# Patient Record
Sex: MALE | Employment: STUDENT | URBAN - NONMETROPOLITAN AREA
[De-identification: names, ages, dates, MRNs, and addresses within clinical notes are randomized per-mention and may not be internally consistent; named-entity substitution may affect disease eponyms.]

---

## 2024-04-03 ENCOUNTER — OFFICE VISIT (OUTPATIENT)
Dept: FAMILY MEDICINE CLINIC | Age: 19
End: 2024-04-03
Payer: COMMERCIAL

## 2024-04-03 VITALS
RESPIRATION RATE: 14 BRPM | HEIGHT: 71 IN | BODY MASS INDEX: 28.61 KG/M2 | SYSTOLIC BLOOD PRESSURE: 120 MMHG | OXYGEN SATURATION: 98 % | WEIGHT: 204.4 LBS | HEART RATE: 97 BPM | TEMPERATURE: 98.2 F | DIASTOLIC BLOOD PRESSURE: 78 MMHG

## 2024-04-03 DIAGNOSIS — R06.02 EXERCISE-INDUCED SHORTNESS OF BREATH: Primary | ICD-10-CM

## 2024-04-03 PROCEDURE — 99204 OFFICE O/P NEW MOD 45 MIN: CPT | Performed by: NURSE PRACTITIONER

## 2024-04-03 RX ORDER — ALBUTEROL SULFATE 90 UG/1
2 AEROSOL, METERED RESPIRATORY (INHALATION) EVERY 4 HOURS PRN
Qty: 18 G | Refills: 2 | Status: SHIPPED | OUTPATIENT
Start: 2024-04-03

## 2024-04-03 RX ORDER — ARIPIPRAZOLE 400 MG
KIT INTRAMUSCULAR
COMMUNITY
Start: 2024-03-19

## 2024-04-03 RX ORDER — FAMOTIDINE 20 MG/1
20 TABLET, FILM COATED ORAL 2 TIMES DAILY PRN
Qty: 180 TABLET | Refills: 1 | Status: SHIPPED | OUTPATIENT
Start: 2024-04-03

## 2024-04-03 SDOH — ECONOMIC STABILITY: FOOD INSECURITY: WITHIN THE PAST 12 MONTHS, YOU WORRIED THAT YOUR FOOD WOULD RUN OUT BEFORE YOU GOT MONEY TO BUY MORE.: NEVER TRUE

## 2024-04-03 SDOH — ECONOMIC STABILITY: INCOME INSECURITY: HOW HARD IS IT FOR YOU TO PAY FOR THE VERY BASICS LIKE FOOD, HOUSING, MEDICAL CARE, AND HEATING?: NOT HARD AT ALL

## 2024-04-03 SDOH — ECONOMIC STABILITY: HOUSING INSECURITY
IN THE LAST 12 MONTHS, WAS THERE A TIME WHEN YOU DID NOT HAVE A STEADY PLACE TO SLEEP OR SLEPT IN A SHELTER (INCLUDING NOW)?: NO

## 2024-04-03 SDOH — ECONOMIC STABILITY: FOOD INSECURITY: WITHIN THE PAST 12 MONTHS, THE FOOD YOU BOUGHT JUST DIDN'T LAST AND YOU DIDN'T HAVE MONEY TO GET MORE.: NEVER TRUE

## 2024-04-03 ASSESSMENT — PATIENT HEALTH QUESTIONNAIRE - PHQ9
1. LITTLE INTEREST OR PLEASURE IN DOING THINGS: NOT AT ALL
SUM OF ALL RESPONSES TO PHQ QUESTIONS 1-9: 0
2. FEELING DOWN, DEPRESSED OR HOPELESS: NOT AT ALL
SUM OF ALL RESPONSES TO PHQ9 QUESTIONS 1 & 2: 0

## 2024-04-03 NOTE — PROGRESS NOTES
Juan Luis Paul is a 18 y.o. male thatpresents for New Patient      History obtained today from Patient.    Diet: appetite is okay, drinks a lot of water   Exercise: regularly   Sleep: no issues    Concerns today: needs abilify, sob with exercise    Used room mate inhaler and helped with exercise induced sob  No wheezing  Doesn't have issues at rest  Would like inhaler  Denies hx of asthma      GERD issues  Taking a lot of tums, prilosec prn  Not helping much   Knows his diet is triggering it    Right flank pain  Only happens with stretching  No fever, chills, chest pain, sob, urinary symptoms, constipation, diarrhea, bloody stools or urine  No injury but has been working out    I have reviewed the patient's past medical history, past surgical history, allergies,medications, social and family history and I have made updates where appropriate.    Past Medical History:   Diagnosis Date    ADHD (attention deficit hyperactivity disorder)        Social History     Tobacco Use    Smoking status: Never     Passive exposure: Never    Smokeless tobacco: Never   Vaping Use    Vaping Use: Former    Substances: Nicotine    Devices: Disposable    Passive vaping exposure: Yes   Substance Use Topics    Alcohol use: Never       History reviewed. No pertinent family history.      Review of Systems        PHYSICAL EXAM:  /78   Pulse 97   Temp 98.2 °F (36.8 °C) (Oral)   Resp 14   Ht 1.803 m (5' 11\")   Wt 92.7 kg (204 lb 6.4 oz)   SpO2 98%   BMI 28.51 kg/m²     Physical Exam  Constitutional:       Appearance: Normal appearance.   HENT:      Head: Normocephalic and atraumatic.      Right Ear: External ear normal.      Left Ear: External ear normal.      Nose: Nose normal.      Mouth/Throat:      Mouth: Mucous membranes are moist.   Eyes:      Conjunctiva/sclera: Conjunctivae normal.   Cardiovascular:      Rate and Rhythm: Normal rate and regular rhythm.      Pulses: Normal pulses.      Heart sounds: Normal heart sounds.

## 2024-04-05 ENCOUNTER — TELEPHONE (OUTPATIENT)
Dept: FAMILY MEDICINE CLINIC | Age: 19
End: 2024-04-05

## 2024-04-05 NOTE — TELEPHONE ENCOUNTER
Please call mom and get info for previuos pcp and psych so we can get records from them, thank you

## 2024-04-05 NOTE — TELEPHONE ENCOUNTER
Faxed a medical records release form over to his last Psych doctor on 4/3/14, mom states he didn't have a previous PCP, just Psych.

## 2024-04-19 ENCOUNTER — HOSPITAL ENCOUNTER (OUTPATIENT)
Dept: GENERAL RADIOLOGY | Age: 19
Discharge: HOME | End: 2024-04-19
Payer: COMMERCIAL

## 2024-04-19 ENCOUNTER — HOSPITAL ENCOUNTER (OUTPATIENT)
Age: 19
Discharge: HOME | End: 2024-04-19
Payer: COMMERCIAL

## 2024-04-19 ENCOUNTER — OFFICE VISIT (OUTPATIENT)
Dept: FAMILY MEDICINE CLINIC | Age: 19
End: 2024-04-19
Payer: COMMERCIAL

## 2024-04-19 VITALS
HEART RATE: 88 BPM | WEIGHT: 212.2 LBS | BODY MASS INDEX: 29.71 KG/M2 | SYSTOLIC BLOOD PRESSURE: 120 MMHG | HEIGHT: 71 IN | DIASTOLIC BLOOD PRESSURE: 82 MMHG | OXYGEN SATURATION: 97 % | TEMPERATURE: 98.5 F | RESPIRATION RATE: 14 BRPM

## 2024-04-19 DIAGNOSIS — R07.81 RIB PAIN ON RIGHT SIDE: ICD-10-CM

## 2024-04-19 DIAGNOSIS — Z72.0 NICOTINE ABUSE: ICD-10-CM

## 2024-04-19 DIAGNOSIS — R07.81 RIB PAIN ON RIGHT SIDE: Primary | ICD-10-CM

## 2024-04-19 PROCEDURE — 99214 OFFICE O/P EST MOD 30 MIN: CPT | Performed by: NURSE PRACTITIONER

## 2024-04-19 PROCEDURE — 71101 X-RAY EXAM UNILAT RIBS/CHEST: CPT

## 2024-04-19 NOTE — PROGRESS NOTES
Juan Luis Paul is a 18 y.o. male thatpresents for Rib pain (Heartburn as well and cannot get his Abilify shot )      History obtained today from Patient.    Heartburn is still uncontrolled   Using Famotidine BID PRN with minimal relief   Then says he isn't taking it  No bloody stools or emesis  Not altering his diet for reflux    Still waiting for his Abilify shot to be filled    I have reviewed the patient's past medical history, past surgical history, allergies,medications, social and family history and I have made updates where appropriate.    Past Medical History:   Diagnosis Date    ADHD (attention deficit hyperactivity disorder)        Social History     Tobacco Use    Smoking status: Never     Passive exposure: Never    Smokeless tobacco: Never   Vaping Use    Vaping Use: Former    Substances: Nicotine    Devices: Disposable    Passive vaping exposure: Yes   Substance Use Topics    Alcohol use: Never       History reviewed. No pertinent family history.      Review of Systems        PHYSICAL EXAM:  /82   Pulse 88   Temp 98.5 °F (36.9 °C) (Oral)   Resp 14   Ht 1.803 m (5' 11\")   Wt 96.3 kg (212 lb 3.2 oz)   SpO2 97%   BMI 29.60 kg/m²     Physical Exam  Constitutional:       Appearance: Normal appearance.   HENT:      Head: Normocephalic and atraumatic.      Right Ear: External ear normal.      Left Ear: External ear normal.      Nose: Nose normal.      Mouth/Throat:      Mouth: Mucous membranes are moist.   Eyes:      Conjunctiva/sclera: Conjunctivae normal.   Cardiovascular:      Rate and Rhythm: Normal rate and regular rhythm.      Pulses: Normal pulses.      Heart sounds: Normal heart sounds.   Pulmonary:      Effort: Pulmonary effort is normal.      Breath sounds: Normal breath sounds.   Abdominal:      General: Bowel sounds are normal.      Palpations: Abdomen is soft.   Musculoskeletal:         General: Normal range of motion.      Cervical back: Normal range of motion.   Skin:     General:

## 2024-04-22 ENCOUNTER — TELEPHONE (OUTPATIENT)
Dept: FAMILY MEDICINE CLINIC | Age: 19
End: 2024-04-22

## 2024-04-22 NOTE — TELEPHONE ENCOUNTER
Pt is questioning since his x-ray is negative and he is still having mild pain with movement, what is the next step.    Please advise
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
## 2024-04-24 ENCOUNTER — HOSPITAL ENCOUNTER (EMERGENCY)
Age: 19
Discharge: HOME | End: 2024-04-24
Attending: EMERGENCY MEDICINE
Payer: COMMERCIAL

## 2024-04-24 ENCOUNTER — APPOINTMENT (OUTPATIENT)
Dept: GENERAL RADIOLOGY | Age: 19
End: 2024-04-24
Payer: COMMERCIAL

## 2024-04-24 ENCOUNTER — NURSE ONLY (OUTPATIENT)
Dept: FAMILY MEDICINE CLINIC | Age: 19
End: 2024-04-24

## 2024-04-24 VITALS
DIASTOLIC BLOOD PRESSURE: 67 MMHG | HEART RATE: 73 BPM | SYSTOLIC BLOOD PRESSURE: 101 MMHG | OXYGEN SATURATION: 98 % | TEMPERATURE: 98.7 F | WEIGHT: 205 LBS | BODY MASS INDEX: 28.7 KG/M2 | HEIGHT: 71 IN | RESPIRATION RATE: 16 BRPM

## 2024-04-24 DIAGNOSIS — K29.01 ACUTE GASTRITIS WITH HEMORRHAGE, UNSPECIFIED GASTRITIS TYPE: Primary | ICD-10-CM

## 2024-04-24 DIAGNOSIS — F41.1 GENERALIZED ANXIETY DISORDER: Primary | ICD-10-CM

## 2024-04-24 LAB
ALBUMIN SERPL BCG-MCNC: 4.2 G/DL (ref 3.5–5.1)
ALP SERPL-CCNC: 71 U/L (ref 38–126)
ALT SERPL W/O P-5'-P-CCNC: 25 U/L (ref 11–66)
ANION GAP SERPL CALC-SCNC: 10 MEQ/L (ref 8–16)
AST SERPL-CCNC: 26 U/L (ref 5–40)
BASOPHILS ABSOLUTE: 0.1 THOU/MM3 (ref 0–0.1)
BASOPHILS NFR BLD AUTO: 1 %
BILIRUB CONJ SERPL-MCNC: < 0.2 MG/DL (ref 0–0.3)
BILIRUB SERPL-MCNC: 0.3 MG/DL (ref 0.3–1.2)
BUN SERPL-MCNC: 15 MG/DL (ref 7–22)
CALCIUM SERPL-MCNC: 9.4 MG/DL (ref 8.5–10.5)
CHLORIDE SERPL-SCNC: 102 MEQ/L (ref 98–111)
CO2 SERPL-SCNC: 27 MEQ/L (ref 23–33)
CREAT SERPL-MCNC: 0.9 MG/DL (ref 0.4–1.2)
DEPRECATED RDW RBC AUTO: 40.7 FL (ref 35–45)
EOSINOPHIL NFR BLD AUTO: 6.1 %
EOSINOPHILS ABSOLUTE: 0.5 THOU/MM3 (ref 0–0.4)
ERYTHROCYTE [DISTWIDTH] IN BLOOD BY AUTOMATED COUNT: 13.2 % (ref 11.5–14.5)
GFR SERPL CREATININE-BSD FRML MDRD: > 90 ML/MIN/1.73M2
GLUCOSE SERPL-MCNC: 90 MG/DL (ref 70–108)
HCT VFR BLD AUTO: 41.9 % (ref 42–52)
HGB BLD-MCNC: 13.7 GM/DL (ref 14–18)
IMM GRANULOCYTES # BLD AUTO: 0.01 THOU/MM3 (ref 0–0.07)
IMM GRANULOCYTES NFR BLD AUTO: 0.1 %
LIPASE SERPL-CCNC: 21.6 U/L (ref 5.6–51.3)
LYMPHOCYTES ABSOLUTE: 3.1 THOU/MM3 (ref 1–4.8)
LYMPHOCYTES NFR BLD AUTO: 38.7 %
MCH RBC QN AUTO: 27.6 PG (ref 26–33)
MCHC RBC AUTO-ENTMCNC: 32.7 GM/DL (ref 32.2–35.5)
MCV RBC AUTO: 84.3 FL (ref 80–94)
MONOCYTES ABSOLUTE: 0.6 THOU/MM3 (ref 0.4–1.3)
MONOCYTES NFR BLD AUTO: 7.6 %
NEUTROPHILS NFR BLD AUTO: 46.5 %
NRBC BLD AUTO-RTO: 0 /100 WBC
OSMOLALITY SERPL CALC.SUM OF ELEC: 277.9 MOSMOL/KG (ref 275–300)
PLATELET # BLD AUTO: 297 THOU/MM3 (ref 130–400)
PMV BLD AUTO: 9.6 FL (ref 9.4–12.4)
POTASSIUM SERPL-SCNC: 4.3 MEQ/L (ref 3.5–5.2)
PROT SERPL-MCNC: 6.8 G/DL (ref 6.1–8)
RBC # BLD AUTO: 4.97 MILL/MM3 (ref 4.7–6.1)
SEGMENTED NEUTROPHILS ABSOLUTE COUNT: 3.7 THOU/MM3 (ref 1.8–7.7)
SODIUM SERPL-SCNC: 139 MEQ/L (ref 135–145)
WBC # BLD AUTO: 8 THOU/MM3 (ref 4.8–10.8)

## 2024-04-24 PROCEDURE — 36415 COLL VENOUS BLD VENIPUNCTURE: CPT

## 2024-04-24 PROCEDURE — 99284 EMERGENCY DEPT VISIT MOD MDM: CPT

## 2024-04-24 PROCEDURE — 85025 COMPLETE CBC W/AUTO DIFF WBC: CPT

## 2024-04-24 PROCEDURE — 96374 THER/PROPH/DIAG INJ IV PUSH: CPT

## 2024-04-24 PROCEDURE — 71045 X-RAY EXAM CHEST 1 VIEW: CPT

## 2024-04-24 PROCEDURE — 80053 COMPREHEN METABOLIC PANEL: CPT

## 2024-04-24 PROCEDURE — A4216 STERILE WATER/SALINE, 10 ML: HCPCS | Performed by: EMERGENCY MEDICINE

## 2024-04-24 PROCEDURE — 83690 ASSAY OF LIPASE: CPT

## 2024-04-24 PROCEDURE — 2500000003 HC RX 250 WO HCPCS: Performed by: EMERGENCY MEDICINE

## 2024-04-24 PROCEDURE — 2580000003 HC RX 258: Performed by: EMERGENCY MEDICINE

## 2024-04-24 PROCEDURE — 82248 BILIRUBIN DIRECT: CPT

## 2024-04-24 PROCEDURE — 96375 TX/PRO/DX INJ NEW DRUG ADDON: CPT

## 2024-04-24 PROCEDURE — 6360000002 HC RX W HCPCS: Performed by: EMERGENCY MEDICINE

## 2024-04-24 RX ORDER — OMEPRAZOLE 20 MG/1
20 CAPSULE, DELAYED RELEASE ORAL
Qty: 30 CAPSULE | Refills: 0 | Status: SHIPPED | OUTPATIENT
Start: 2024-04-24

## 2024-04-24 RX ORDER — ONDANSETRON 2 MG/ML
4 INJECTION INTRAMUSCULAR; INTRAVENOUS ONCE
Status: COMPLETED | OUTPATIENT
Start: 2024-04-24 | End: 2024-04-24

## 2024-04-24 RX ORDER — 0.9 % SODIUM CHLORIDE 0.9 %
1000 INTRAVENOUS SOLUTION INTRAVENOUS ONCE
Status: COMPLETED | OUTPATIENT
Start: 2024-04-24 | End: 2024-04-24

## 2024-04-24 RX ORDER — SUCRALFATE 1 G/1
1 TABLET ORAL 4 TIMES DAILY
Qty: 120 TABLET | Refills: 0 | Status: SHIPPED | OUTPATIENT
Start: 2024-04-24

## 2024-04-24 RX ADMIN — SODIUM CHLORIDE 1000 ML: 9 INJECTION, SOLUTION INTRAVENOUS at 01:21

## 2024-04-24 RX ADMIN — ONDANSETRON 4 MG: 2 INJECTION INTRAMUSCULAR; INTRAVENOUS at 01:16

## 2024-04-24 RX ADMIN — FAMOTIDINE 20 MG: 10 INJECTION, SOLUTION INTRAVENOUS at 01:19

## 2024-04-24 ASSESSMENT — PAIN - FUNCTIONAL ASSESSMENT
PAIN_FUNCTIONAL_ASSESSMENT: NONE - DENIES PAIN
PAIN_FUNCTIONAL_ASSESSMENT: NONE - DENIES PAIN

## 2024-04-24 NOTE — ED NOTES
Patient medicated per MAR. Patient requesting to speak with provider at this time. Patient states \"this feels like a waste of my time. This has been an ongoing thing for a while and my primary doctor knows about it. So I feel like I should just go. Im pissed because ill probably have to miss work since I'm here. I think I will just leave after I get the results of the xray.\" Provider notified.

## 2024-04-24 NOTE — ED TRIAGE NOTES
Pt presents to the Ed with c/o hematemesis that started earlier today after school. Pt is unable to states how many times he has thrown up and how much blood he has thrown up. Pt states this happens to him often. Pt states when he has thrown up, every other time is straight blood.  Pt states he is unaware of hat has made him throw up and that he just randomly started throwing up.

## 2024-04-24 NOTE — DISCHARGE INSTRUCTIONS
You were seen for vomiting and vomiting blood.  Most likely this is an exacerbation of your gastritis resulting in some bleeding.  Your hemoglobin was stable.  Please start Prilosec in addition to the Pepcid.  Please also start Carafate for symptomatic relief.  You need to call your primary care doctor and schedule follow-up appointment as you may benefit from a GI evaluation.  If you develop abdominal pain, worsening bleeding, lightheadedness, or any other concerning symptoms please return to the emergency room for evaluation.

## 2024-04-24 NOTE — ED PROVIDER NOTES
but I have low suspicion of:   Esophageal varices, Boerhaave syndrome             Pertinent Comorbid Conditions:    Gastritis, ADHD    2)  Data Reviewed (none if left blank)          My Independent interpretations:       Imaging: See ED course    Labs:      See ED course            External Documentation Reviewed:         Previous patient encounter documents & history available on EMR was reviewed              See Formal Diagnostic Results above for the lab and radiology tests and orders.    3)  Treatment and Disposition         ED Reassessment: See ED course         Shared Decision-Making was performed and disposition discussed with the        Patient/Family and questions answered          Social determinants of health impacting treatment or disposition: None         Code Status: Full      Summary of Patient Presentation:      MDM  Number of Diagnoses or Management Options  Acute gastritis with hemorrhage, unspecified gastritis type  Diagnosis management comments: 18-year-old male presents emergency room with complaints of hematemesis.  Patient does have a history of heartburn and gastritis.  It does not appear he follows any diet to prevent this from happening.  Vitals are stable on exam.  No evidence of vomiting here.  Will evaluate with CBC, CMP, lipase, chest x-ray.  Will give IV fluids and Pepcid here for symptomatic relief.    /  ED Course as of 04/24/24 0514   Wed Apr 24, 2024   0157 No evidence of leukocytosis.  Patient has a normal differential.  Hemoglobin is mildly low for a 18-year-old male but within the normal range.  Electrolytes are within normal limits.  Liver enzymes are within normal limits.  Lipase is normal. [DD]   0158 Chest x-ray is negative for any pneumomediastinum, pneumonia, pneumothorax, or pulmonary edema. [DD]      ED Course User Index  [DD] Eva Hahn MD     Vitals Reviewed:    Vitals:    04/24/24 0031 04/24/24 0133   BP: 112/68 101/67   Pulse: 89 73   Resp: 18 16   Temp: 98.7

## 2024-04-24 NOTE — PROGRESS NOTES
Administrations This Visit       ARIPiprazole ER (ABILIFY MAINTENA) 400 mg       Admin Date  04/24/2024  15:59 Action  Given Dose  400 mg Route  IntraMUSCular Site  Deltoid Left Administered By  Nathalia Hill CMA ([facility])    Ordering Provider: Sima Isbell APRN - CNP    NDC: 81547-998-28    Lot#: [id number]    : [employer]    Patient Supplied?: Yes                       Advised pt to report any adverse reactions.

## 2024-05-29 ENCOUNTER — HOSPITAL ENCOUNTER (EMERGENCY)
Age: 19
Discharge: HOME | End: 2024-05-29
Payer: COMMERCIAL

## 2024-05-29 ENCOUNTER — NURSE ONLY (OUTPATIENT)
Dept: FAMILY MEDICINE CLINIC | Age: 19
End: 2024-05-29

## 2024-05-29 VITALS
BODY MASS INDEX: 28.7 KG/M2 | OXYGEN SATURATION: 98 % | WEIGHT: 205 LBS | HEART RATE: 101 BPM | SYSTOLIC BLOOD PRESSURE: 124 MMHG | DIASTOLIC BLOOD PRESSURE: 69 MMHG | HEIGHT: 71 IN | RESPIRATION RATE: 16 BRPM

## 2024-05-29 DIAGNOSIS — R11.2 NAUSEA AND VOMITING, UNSPECIFIED VOMITING TYPE: ICD-10-CM

## 2024-05-29 DIAGNOSIS — J02.9 VIRAL PHARYNGITIS: Primary | ICD-10-CM

## 2024-05-29 DIAGNOSIS — F41.1 GENERALIZED ANXIETY DISORDER: Primary | ICD-10-CM

## 2024-05-29 LAB — S PYO AG THROAT QL: NEGATIVE

## 2024-05-29 PROCEDURE — 99203 OFFICE O/P NEW LOW 30 MIN: CPT | Performed by: NURSE PRACTITIONER

## 2024-05-29 PROCEDURE — 87651 STREP A DNA AMP PROBE: CPT

## 2024-05-29 PROCEDURE — 99213 OFFICE O/P EST LOW 20 MIN: CPT

## 2024-05-29 RX ORDER — ONDANSETRON 4 MG/1
4 TABLET, ORALLY DISINTEGRATING ORAL 3 TIMES DAILY PRN
Qty: 15 TABLET | Refills: 0 | Status: SHIPPED | OUTPATIENT
Start: 2024-05-29

## 2024-05-29 RX ORDER — GUAIFENESIN AND DEXTROMETHORPHAN HYDROBROMIDE 1200; 60 MG/1; MG/1
1 TABLET, EXTENDED RELEASE ORAL EVERY 12 HOURS PRN
Qty: 20 TABLET | Refills: 0 | Status: SHIPPED | OUTPATIENT
Start: 2024-05-29

## 2024-05-29 RX ORDER — ARIPIPRAZOLE 400 MG
400 KIT INTRAMUSCULAR
Qty: 1 EACH | Refills: 11 | Status: SHIPPED | OUTPATIENT
Start: 2024-05-29

## 2024-05-29 ASSESSMENT — ENCOUNTER SYMPTOMS
VOMITING: 1
ABDOMINAL PAIN: 0
SHORTNESS OF BREATH: 0
SORE THROAT: 1
NAUSEA: 1
COUGH: 1

## 2024-05-29 ASSESSMENT — PAIN DESCRIPTION - LOCATION: LOCATION: THROAT

## 2024-05-29 ASSESSMENT — PAIN - FUNCTIONAL ASSESSMENT: PAIN_FUNCTIONAL_ASSESSMENT: 0-10

## 2024-05-29 ASSESSMENT — PAIN SCALES - GENERAL: PAINLEVEL_OUTOF10: 7

## 2024-05-29 NOTE — PROGRESS NOTES
Administrations This Visit       ARIPiprazole ER (ABILIFY MAINTENA) 400 mg       Admin Date  05/29/2024  13:17 Action  Given Dose  400 mg Route  IntraMUSCular Site  Deltoid Right Administered By  Vilma Ji LPN    Ordering Provider: Sima Isbell APRN - CNP    NDC: 43420-170-21    Lot#: [id number]    : [employer]    Patient Supplied?: Yes                       Advised pt to report any adverse reactions.

## 2024-05-29 NOTE — ED PROVIDER NOTES
[facility] URGENT [facility]  Urgent Care Encounter       CHIEF COMPLAINT       Chief Complaint   Patient presents with    Nasal Congestion    Pharyngitis    Headache       Nurses Notes reviewed and I agree except as noted in the HPI.  HISTORY OF PRESENT ILLNESS   Juan Luis Paul is a 19 y.o. male who presents with new onset nasal congestion, sore throat, cough, headache, upper body aches, and nausea vomiting.  His symptoms started greater than a day ago.  He admits to his roommate having similar symptoms.  Complains of worsening sore throat this morning.  Denies any known fever.  Has not tried any treatment.    The history is provided by the patient.       REVIEW OF SYSTEMS     Review of Systems   Constitutional:  Negative for fatigue and fever.   HENT:  Positive for sore throat. Negative for congestion.    Respiratory:  Positive for cough. Negative for shortness of breath.    Cardiovascular:  Negative for chest pain.   Gastrointestinal:  Positive for nausea and vomiting. Negative for abdominal pain.   Musculoskeletal:  Positive for myalgias.   Neurological:  Positive for headaches.       PAST MEDICAL HISTORY         Diagnosis Date    ADHD (attention deficit hyperactivity disorder)        SURGICALHISTORY     Patient  has a past surgical history that includes Spinal fusion.    CURRENT MEDICATIONS       Previous Medications    ABILIFY MAINTENA 400 MG PRSY        ALBUTEROL SULFATE HFA (PROVENTIL;VENTOLIN;PROAIR) 108 (90 BASE) MCG/ACT INHALER    Inhale 2 puffs into the lungs every 4 hours as needed for Shortness of Breath    FAMOTIDINE (PEPCID) 20 MG TABLET    Take 1 tablet by mouth 2 times daily as needed (indigestion)    NICOTINE (NICOTROL) 10 MG INHALER    Inhale 2 puffs into the lungs as needed for Smoking cessation    OMEPRAZOLE (PRILOSEC) 20 MG DELAYED RELEASE CAPSULE    Take 1 capsule by mouth every morning (before breakfast)    SUCRALFATE (CARAFATE) 1 GM TABLET    Take 1 tablet by mouth 4 times daily

## 2024-05-29 NOTE — ED NOTES
Pt complains of sore throat and nausea that started on yesterday. Is also asking to have his Abilify shot given while here. Informed pt we do not do that and he should make a nurse office visit at his prescribers office to take care of that.     Megan Art, RN  05/29/24 0996

## 2024-07-09 ENCOUNTER — PATIENT MESSAGE (OUTPATIENT)
Dept: FAMILY MEDICINE CLINIC | Age: 19
End: 2024-07-09

## 2024-07-09 PROBLEM — M79.10 MUSCLE PAIN: Status: ACTIVE | Noted: 2024-07-09

## 2024-07-09 PROBLEM — R12 HEARTBURN: Status: ACTIVE | Noted: 2024-07-09

## 2024-07-09 PROBLEM — F44.5 PSYCHOGENIC NONEPILEPTIC SEIZURE: Status: ACTIVE | Noted: 2019-09-13

## 2024-07-09 PROBLEM — J35.8 TONSILLAR CALCULUS: Status: ACTIVE | Noted: 2024-04-01

## 2024-07-10 ENCOUNTER — OFFICE VISIT (OUTPATIENT)
Dept: FAMILY MEDICINE CLINIC | Age: 19
End: 2024-07-10
Payer: COMMERCIAL

## 2024-07-10 VITALS
OXYGEN SATURATION: 98 % | WEIGHT: 213.6 LBS | SYSTOLIC BLOOD PRESSURE: 122 MMHG | RESPIRATION RATE: 14 BRPM | TEMPERATURE: 98.2 F | HEIGHT: 71 IN | BODY MASS INDEX: 29.9 KG/M2 | DIASTOLIC BLOOD PRESSURE: 82 MMHG | HEART RATE: 91 BPM

## 2024-07-10 DIAGNOSIS — R68.2 DRY MOUTH: ICD-10-CM

## 2024-07-10 DIAGNOSIS — F41.1 GENERALIZED ANXIETY DISORDER: ICD-10-CM

## 2024-07-10 DIAGNOSIS — F34.81 DISRUPTIVE MOOD DYSREGULATION DISORDER (HCC): ICD-10-CM

## 2024-07-10 DIAGNOSIS — R06.02 EXERCISE-INDUCED SHORTNESS OF BREATH: ICD-10-CM

## 2024-07-10 DIAGNOSIS — F90.2 ATTENTION DEFICIT HYPERACTIVITY DISORDER (ADHD), COMBINED TYPE: Primary | ICD-10-CM

## 2024-07-10 PROCEDURE — 96372 THER/PROPH/DIAG INJ SC/IM: CPT | Performed by: NURSE PRACTITIONER

## 2024-07-10 PROCEDURE — 99214 OFFICE O/P EST MOD 30 MIN: CPT | Performed by: NURSE PRACTITIONER

## 2024-07-10 RX ORDER — ALBUTEROL SULFATE 90 UG/1
2 AEROSOL, METERED RESPIRATORY (INHALATION) EVERY 4 HOURS PRN
Qty: 18 G | Refills: 2 | Status: SHIPPED | OUTPATIENT
Start: 2024-07-10

## 2024-07-10 RX ORDER — SALIVA STIMULANT COMB. NO.3
1 SPRAY, NON-AEROSOL (ML) MUCOUS MEMBRANE PRN
Qty: 44.3 ML | Refills: 2 | Status: SHIPPED | OUTPATIENT
Start: 2024-07-10

## 2024-07-10 NOTE — TELEPHONE ENCOUNTER
Future Appointments   Date Time Provider Department Center   7/10/2024  3:40 PM Sima Isbell APRN - CNP [facility] [place]

## 2024-07-10 NOTE — PROGRESS NOTES
Juan Luis Paul is a 19 y.o. male thatpresents for Other (Abilify shot and ADHD discussion)      History obtained today from Patient.    Here for ADHD evaluation  Says he \"tried literally every ADD med in the past and nothing worked\"  Says he was put on high doses and couldn't tolerate them  Says his symptoms are very disruptive, having difficulty concentrating and finishing tasks    Recent Performance in School - not doing well in school    Hyper active symptoms (Fidgeting, restlessness, excessive talking)?  yes: fidgeting, excessive talking  Disruptive symptoms?  yes  Difficulties with attention?  yes  Behavioral problems?  no  Difficulties with social relationships?  no  Mood Symptoms?  yes: good with his current meds  Difficulty with sleep?  yes: trouble shutting his mind off   Changes in appetite?  no    Symptoms present prior to age 6?  yes  Symptoms present at home?  yes  Symptoms present at school/?  yes  Has patient met developmental milestones?  yes        I have reviewed the patient's past medical history, past surgical history, allergies,medications, social and family history and I have made updates where appropriate.    Past Medical History:   Diagnosis Date    ADHD (attention deficit hyperactivity disorder)        Social History     Tobacco Use    Smoking status: Never     Passive exposure: Never    Smokeless tobacco: Never   Vaping Use    Vaping Use: Former    Substances: Nicotine    Devices: Disposable    Passive vaping exposure: Yes   Substance Use Topics    Alcohol use: Never       History reviewed. No pertinent family history.      Review of Systems        PHYSICAL EXAM:  /82   Pulse 91   Temp 98.2 °F (36.8 °C) (Oral)   Resp 14   Ht 1.803 m (5' 11\")   Wt 96.9 kg (213 lb 9.6 oz)   SpO2 98%   BMI 29.79 kg/m²     Physical Exam  Constitutional:       Appearance: Normal appearance.   HENT:      Head: Normocephalic and atraumatic.      Right Ear: External ear normal.      Left Ear:

## 2024-07-10 NOTE — PROGRESS NOTES
Medication(s) given during visit:    Administrations This Visit       ARIPiprazole ER (ABILIFY MAINTENA) 400 mg       Admin Date  07/10/2024  16:40 Action  Given Dose  400 mg Route  IntraMUSCular Site  Deltoid Left Administered By  Amanda Artis MA    Ordering Provider: Sima Isbell APRN - CNP    NDC: 12991-569-47    Lot#: [id number]    : [employer]    Patient Supplied?: Yes                    Patient instructed to remain in clinic for 20 minutes after injection and was advised to report any adverse reaction to me immediately.

## 2024-08-13 ENCOUNTER — TELEPHONE (OUTPATIENT)
Dept: FAMILY MEDICINE CLINIC | Age: 19
End: 2024-08-13

## 2024-08-13 DIAGNOSIS — J35.8 TONSIL STONE: Primary | ICD-10-CM

## 2024-08-13 NOTE — TELEPHONE ENCOUNTER
He said it's not sore. He said he looked it up on the internet and said he believes it is tonsil stones. He said he has been dealing with it for months. He said \"I am just tired of this rotten taste in my mouth.\"

## 2024-08-13 NOTE — TELEPHONE ENCOUNTER
---- Message -----       From:Juan Luis Paul       Sent:8/13/2024  2:30 PM EDT         To:IMELDA Sears CNP    Subject:Appointment Request    Appointment Request From: Juan Luis Paul    With Provider: IMELDA Sears CNP [[facility]]    Preferred Date Range: 8/14/2024 - 8/17/2024    Preferred Times: Monday Afternoon, Tuesday Afternoon, Wednesday Afternoon, Thursday Afternoon, Friday Afternoon    Reason for visit: Office Visit    Comments:  Need referral to get tonsils removed

## 2024-08-13 NOTE — TELEPHONE ENCOUNTER
Schedule appt  Would advise pt that ENT has criteria that patients have to meet before they remove them.  Is he having issues with sore throat?

## 2024-09-09 ENCOUNTER — LAB (OUTPATIENT)
Dept: FAMILY MEDICINE CLINIC | Age: 19
End: 2024-09-09

## 2024-09-11 ENCOUNTER — HOSPITAL ENCOUNTER (EMERGENCY)
Age: 19
Discharge: HOME | End: 2024-09-11
Payer: COMMERCIAL

## 2024-09-11 VITALS
HEART RATE: 108 BPM | OXYGEN SATURATION: 96 % | BODY MASS INDEX: 32.2 KG/M2 | TEMPERATURE: 98.3 F | WEIGHT: 230 LBS | SYSTOLIC BLOOD PRESSURE: 132 MMHG | RESPIRATION RATE: 16 BRPM | DIASTOLIC BLOOD PRESSURE: 80 MMHG | HEIGHT: 71 IN

## 2024-09-11 DIAGNOSIS — U07.1 COVID-19: Primary | ICD-10-CM

## 2024-09-11 LAB — SARS-COV-2 RDRP RESP QL NAA+PROBE: DETECTED

## 2024-09-11 PROCEDURE — 99213 OFFICE O/P EST LOW 20 MIN: CPT

## 2024-09-11 PROCEDURE — 87635 SARS-COV-2 COVID-19 AMP PRB: CPT

## 2024-09-11 ASSESSMENT — ENCOUNTER SYMPTOMS
COUGH: 1
ABDOMINAL PAIN: 0
VOMITING: 0
SORE THROAT: 1
CONSTIPATION: 0
NAUSEA: 1

## 2024-09-11 ASSESSMENT — PAIN - FUNCTIONAL ASSESSMENT: PAIN_FUNCTIONAL_ASSESSMENT: NONE - DENIES PAIN

## 2024-10-09 ENCOUNTER — LAB (OUTPATIENT)
Dept: FAMILY MEDICINE CLINIC | Age: 19
End: 2024-10-09

## 2024-10-09 DIAGNOSIS — F41.1 GENERALIZED ANXIETY DISORDER: Primary | ICD-10-CM

## 2024-10-09 NOTE — PROGRESS NOTES
Administrations This Visit       ARIPiprazole ER (ABILIFY MAINTENA) 400 mg       Admin Date  10/09/2024  14:26 Action  Given Dose  400 mg Route  IntraMUSCular Site  Deltoid Right Documented By  Nathalia Hill CMA ([facility])    NDC: 64877-638-26    Lot#: [id number]    : [employer]    Patient Supplied?: Yes                       Advised pt to report any adverse reactions.

## 2024-10-24 ENCOUNTER — APPOINTMENT (OUTPATIENT)
Dept: GENERAL RADIOLOGY | Age: 19
End: 2024-10-24
Payer: COMMERCIAL

## 2024-10-24 ENCOUNTER — HOSPITAL ENCOUNTER (EMERGENCY)
Age: 19
Discharge: HOME | End: 2024-10-24
Payer: COMMERCIAL

## 2024-10-24 VITALS
WEIGHT: 215 LBS | RESPIRATION RATE: 20 BRPM | HEART RATE: 93 BPM | HEIGHT: 71 IN | DIASTOLIC BLOOD PRESSURE: 69 MMHG | OXYGEN SATURATION: 97 % | BODY MASS INDEX: 30.1 KG/M2 | SYSTOLIC BLOOD PRESSURE: 123 MMHG | TEMPERATURE: 97.8 F

## 2024-10-24 DIAGNOSIS — R05.1 ACUTE COUGH: Primary | ICD-10-CM

## 2024-10-24 PROCEDURE — 99213 OFFICE O/P EST LOW 20 MIN: CPT

## 2024-10-24 PROCEDURE — 71046 X-RAY EXAM CHEST 2 VIEWS: CPT

## 2024-10-24 RX ORDER — BENZONATATE 100 MG/1
100 CAPSULE ORAL 3 TIMES DAILY PRN
Qty: 21 CAPSULE | Refills: 0 | Status: SHIPPED | OUTPATIENT
Start: 2024-10-24 | End: 2024-10-31

## 2024-10-24 RX ORDER — PREDNISONE 10 MG/1
TABLET ORAL
Qty: 20 TABLET | Refills: 0 | Status: SHIPPED | OUTPATIENT
Start: 2024-10-24 | End: 2024-11-03

## 2024-10-24 ASSESSMENT — PAIN - FUNCTIONAL ASSESSMENT: PAIN_FUNCTIONAL_ASSESSMENT: NONE - DENIES PAIN

## 2024-10-24 NOTE — DISCHARGE INSTRUCTIONS
Your x-ray today was normal.  This is most likely a viral cough.    Please take prednisone until gone, this decreases inflammation in airways and decreases cough response.  Okay to use Tessalon on a as needed basis, this also decreases cough.    Please hydrate well keeping urine clear/pale yellow and get plenty of rest, this is the best way to decrease severity and expedite resolution of viral symptoms.    We can attribute your current symptoms to a virus, antibiotics will not help address a virus so they are not indicated.  Unnecessary antibiotics will cause significant side effects including diarrhea and potential infections.  Please practice good hand hygiene to prevent spread of your illness, minimize interactions with others.    Okay to alternate Tylenol/Motrin every 3 hours to treat fever/body aches.  For example, Tylenol at noon, Motrin at 3 PM and then Tylenol again at 6 PM…    Okay to return to work/school function as long as you are fever free without the use of Tylenol/Motrin for 24 hours and your symptoms are overall improving.    See your family doctor if symptoms fail to improve or sooner if they worsen, return to ER/urgent care for any other urgent/emergent medical concerns.    Hope you are feeling better soon!

## 2024-10-24 NOTE — ED PROVIDER NOTES
[facility]      URGENT CARE     Pt Name: Juan Luis Paul  MRN: [id number]  Birthdate 2005  Date of evaluation: 10/24/2024  Provider: IMELDA Pedersen CNP    Urgent Care Encounter     CHIEF COMPLAINT       Chief Complaint   Patient presents with    Cough    Pharyngitis    Headache    Nausea     HISTORY OF PRESENT ILLNESS   Juan Luis Paul is a 19 y.o. male who presents to urgent care with chief complaint of cough/phlegm for a week.  Scratchy throat and headache with concomitant nausea.  Denies history of the symptoms.  Admits to daily vaping.  Denies chest pains.  Denies taking anything for his symptoms, states he was instructed to come be evaluated by his teacher at school expressed concern over his persistent cough.  Denies shortness of breath.  \"I don't think I have anything, I think it is fine.\"  Denies chronic medical conditions or daily medications.    History obtained from patient  URGENT CARE TIMELINE      ED Course as of 10/24/24 1907   Thu Oct 24, 2024   1836 SpO2: 97 % [JR]   1836 Vital signs are stable.  Afebrile.  Oxygenating well. [JR]   1841 XR CHEST (2 VW)  Normal x-ray.  Will wait for official radiologist read. [JR]   1905 XR CHEST (2 VW)  Normal chest. [JR]      ED Course User Index  [JR] Woo Marti APRN - CNP     PAST MEDICAL HISTORY         Diagnosis Date    ADHD (attention deficit hyperactivity disorder)      SURGICALHISTORY     Patient  has a past surgical history that includes Spinal fusion.  CURRENT MEDICATIONS       Previous Medications    ABILIFY MAINTENA 400 MG PRSY    Inject 400 mg into the muscle every 28 days    ALBUTEROL SULFATE HFA (PROVENTIL;VENTOLIN;PROAIR) 108 (90 BASE) MCG/ACT INHALER    Inhale 2 puffs into the lungs every 4 hours as needed for Shortness of Breath    BUPROPION (WELLBUTRIN XL) 150 MG EXTENDED RELEASE TABLET        PANTOPRAZOLE (PROTONIX) 40 MG TABLET         ALLERGIES     Patient is is allergic to sulfa

## 2024-10-24 NOTE — ED NOTES
Pt with complaints of a cough, sore throat, nausea and headache that started 1 week ago. States he has tried nyquil and dayquil on and off but it has not helped. States he vomits yellow and green mucus every morning.     Navid Olea, MAMADOU  10/24/24 5669

## 2024-11-04 ENCOUNTER — HOSPITAL ENCOUNTER (EMERGENCY)
Age: 19
Discharge: HOME | End: 2024-11-04
Payer: COMMERCIAL

## 2024-11-04 VITALS
DIASTOLIC BLOOD PRESSURE: 85 MMHG | OXYGEN SATURATION: 97 % | HEART RATE: 75 BPM | TEMPERATURE: 97.9 F | RESPIRATION RATE: 16 BRPM | SYSTOLIC BLOOD PRESSURE: 128 MMHG

## 2024-11-04 DIAGNOSIS — J40 BRONCHITIS: Primary | ICD-10-CM

## 2024-11-04 PROCEDURE — 99214 OFFICE O/P EST MOD 30 MIN: CPT

## 2024-11-04 PROCEDURE — 99213 OFFICE O/P EST LOW 20 MIN: CPT

## 2024-11-04 RX ORDER — PREDNISONE 20 MG/1
TABLET ORAL
Qty: 15 TABLET | Refills: 0 | Status: SHIPPED | OUTPATIENT
Start: 2024-11-04 | End: 2024-11-13

## 2024-11-04 ASSESSMENT — ENCOUNTER SYMPTOMS
CONSTIPATION: 0
RHINORRHEA: 1
SINUS PRESSURE: 0
EYE PAIN: 0
PHOTOPHOBIA: 0
ABDOMINAL PAIN: 0
DIARRHEA: 0
VOMITING: 0
SORE THROAT: 1
NAUSEA: 0
COLOR CHANGE: 0
CHEST TIGHTNESS: 0
SINUS PAIN: 0
EYE REDNESS: 0
WHEEZING: 0
COUGH: 1

## 2024-11-04 NOTE — ED TRIAGE NOTES
Pt was here for cough for over 2 weeks states he was here on 10/24 and given medications. States he did take medications as prescribed but is no better still coughing and now losing voice.

## 2024-11-04 NOTE — DISCHARGE INSTRUCTIONS
I sent in another round of steroids for you.  You can take 40 mg for 5 days and then 20 mg for 5 days.  Use your albuterol inhaler every morning and then every 4-6 hours as needed.  I recommend you start using a daily antihistamine, such as Zyrtec.  You can use chlorpheniramine maleate as needed for cough at night and postnasal drip.  You can use Mucinex, I recommended without the DM.  Ensure that they are drinking extra water with this, take separately with a full glass of water.  You can use [place] pot or nasal rinse for symptom relief of sinus pressure.

## 2024-11-04 NOTE — ED PROVIDER NOTES
[facility] URGENT CARE  Urgent Care Encounter       CHIEF COMPLAINT       Chief Complaint   Patient presents with    Cough    Hoarse       Nurses Notes reviewed and I agree except as noted in the HPI.  HISTORY OF PRESENT ILLNESS   Juan Luis Paul is a 19 y.o. male who presents to [facility] urgent care for evaluation of cough and hoarse voice.  Patient reports that he was evaluated last week for cough as well.  He was given 5 days of prednisone.  He reports that it helped a little bit but the cough did return.  He reports a productive cough with yellow sometimes green mucus.  Pt denies any fever, chills, fatigue, SOB, CP, light-headedness or dizziness, numbness or tingling, abd pain, N/V/D, constipation or urinary complaints.      The history is provided by the patient. No  was used.       REVIEW OF SYSTEMS     Review of Systems   Constitutional:  Negative for fatigue and fever.   HENT:  Positive for congestion, postnasal drip, rhinorrhea and sore throat. Negative for ear pain, sinus pressure and sinus pain.    Eyes:  Negative for photophobia, pain and redness.   Respiratory:  Positive for cough. Negative for chest tightness and wheezing.    Cardiovascular:  Negative for chest pain.   Gastrointestinal:  Negative for abdominal pain, constipation, diarrhea, nausea and vomiting.   Genitourinary:  Negative for difficulty urinating.   Skin:  Negative for color change.   Allergic/Immunologic: Positive for environmental allergies.   Neurological:  Negative for dizziness and headaches.   Hematological:  Negative for adenopathy. Does not bruise/bleed easily.   Psychiatric/Behavioral:  Negative for suicidal ideas.    All other systems reviewed and are negative.      PAST MEDICAL HISTORY         Diagnosis Date    ADHD (attention deficit hyperactivity disorder)        SURGICALHISTORY     Patient  has a past surgical history that includes Spinal fusion.    CURRENT MEDICATIONS       Discharge  impacted cerumen. Tympanic membrane is bulging.      Nose: Congestion present.      Mouth/Throat:      Lips: Pink.      Mouth: Mucous membranes are moist.      Tongue: No lesions.      Pharynx: Posterior oropharyngeal erythema and postnasal drip present. No pharyngeal swelling or oropharyngeal exudate.   Eyes:      General:         Right eye: No discharge.         Left eye: No discharge.      Extraocular Movements: Extraocular movements intact.      Conjunctiva/sclera: Conjunctivae normal.      Pupils: Pupils are equal, round, and reactive to light.   Cardiovascular:      Rate and Rhythm: Normal rate and regular rhythm.      Pulses: Normal pulses.      Heart sounds: Normal heart sounds.   Pulmonary:      Effort: Pulmonary effort is normal. No tachypnea, bradypnea, accessory muscle usage, respiratory distress or retractions.      Breath sounds: Normal breath sounds. No decreased air movement.      Comments: Inflammatory lung sounds in upper chest, otherwise lung fields are free from adventitious sounds.  Abdominal:      General: Bowel sounds are normal.      Palpations: Abdomen is soft.      Tenderness: There is no abdominal tenderness. There is no right CVA tenderness, left CVA tenderness or rebound.   Musculoskeletal:         General: Normal range of motion.      Cervical back: Normal range of motion.   Lymphadenopathy:      Head:      Right side of head: Submandibular adenopathy present. No submental, tonsillar, preauricular, posterior auricular or occipital adenopathy.      Left side of head: Submandibular adenopathy present. No submental, tonsillar, preauricular, posterior auricular or occipital adenopathy.   Skin:     General: Skin is warm and dry.      Capillary Refill: Capillary refill takes less than 2 seconds.   Neurological:      General: No focal deficit present.      Mental Status: He is alert and oriented to person, place, and time. Mental status is at baseline.   Psychiatric:         Mood and Affect:

## 2024-11-11 ENCOUNTER — LAB (OUTPATIENT)
Dept: FAMILY MEDICINE CLINIC | Age: 19
End: 2024-11-11

## 2024-11-11 DIAGNOSIS — F41.1 GENERALIZED ANXIETY DISORDER: Primary | ICD-10-CM

## 2024-11-11 NOTE — PROGRESS NOTES
Medication(s) given during visit:    Administrations This Visit       ARIPiprazole ER (ABILIFY MAINTENA) 400 mg       Admin Date  11/11/2024  13:12 Action  Given Dose  400 mg Route  IntraMUSCular Site  Deltoid Left Documented By  Karin Garcia MA    NDC: 82945-834-02    Lot#: [id number]    : [employer]    Patient Supplied?: Yes                    Patient instructed to remain in clinic for 20 minutes after injection and was advised to report any adverse reaction to me immediately.

## 2024-11-22 ENCOUNTER — OFFICE VISIT (OUTPATIENT)
Dept: FAMILY MEDICINE CLINIC | Age: 19
End: 2024-11-22
Payer: COMMERCIAL

## 2024-11-22 VITALS
TEMPERATURE: 98 F | HEART RATE: 92 BPM | RESPIRATION RATE: 16 BRPM | DIASTOLIC BLOOD PRESSURE: 68 MMHG | BODY MASS INDEX: 28 KG/M2 | HEIGHT: 71 IN | WEIGHT: 200 LBS | SYSTOLIC BLOOD PRESSURE: 120 MMHG | OXYGEN SATURATION: 95 %

## 2024-11-22 DIAGNOSIS — R05.1 ACUTE COUGH: Primary | ICD-10-CM

## 2024-11-22 DIAGNOSIS — J32.9 RHINOSINUSITIS: ICD-10-CM

## 2024-11-22 DIAGNOSIS — R06.02 SOB (SHORTNESS OF BREATH): ICD-10-CM

## 2024-11-22 PROCEDURE — 99214 OFFICE O/P EST MOD 30 MIN: CPT | Performed by: NURSE PRACTITIONER

## 2024-11-22 RX ORDER — DOXYCYCLINE HYCLATE 100 MG
100 TABLET ORAL 2 TIMES DAILY
Qty: 14 TABLET | Refills: 0 | Status: SHIPPED | OUTPATIENT
Start: 2024-11-22 | End: 2024-11-29

## 2024-11-22 RX ORDER — PREDNISONE 20 MG/1
40 TABLET ORAL DAILY
Qty: 10 TABLET | Refills: 0 | Status: SHIPPED | OUTPATIENT
Start: 2024-11-22 | End: 2024-11-27

## 2024-11-22 RX ORDER — CETIRIZINE HYDROCHLORIDE 10 MG/1
10 TABLET ORAL DAILY
Qty: 30 TABLET | Refills: 0 | Status: SHIPPED | OUTPATIENT
Start: 2024-11-22

## 2024-11-22 RX ORDER — DEXTROMETHORPHAN HYDROBROMIDE AND PROMETHAZINE HYDROCHLORIDE 15; 6.25 MG/5ML; MG/5ML
5 SYRUP ORAL 4 TIMES DAILY PRN
Qty: 140 ML | Refills: 0 | Status: SHIPPED | OUTPATIENT
Start: 2024-11-22 | End: 2024-11-29

## 2024-11-22 RX ORDER — FLUTICASONE PROPIONATE 50 MCG
2 SPRAY, SUSPENSION (ML) NASAL DAILY
Qty: 16 G | Refills: 0 | Status: SHIPPED | OUTPATIENT
Start: 2024-11-22

## 2024-11-22 NOTE — PROGRESS NOTES
SUBJECTIVE:  Juan Luis Paul is a 19 y.o. y/o male that presents with Cough (Started about a month ago/Has been on two different steroids /Had tessalon pearls )  .    HPI:      Symptoms have been present for 1 month(s).  Symptoms are worse since they initially started.    Fever? No  Runny nose or congestion?  Yes, stuffy nose and sinus congestion  Cough?  Yes  Sore throat?  No  Shortness of breath/Wheezing? Yes, wheezing  Other associated symptoms?  headaches      Known COVID exposures or RFs?  unknown  Smoker?  Yes, cigarettes and vapes  Preexisting Respiratory conditions?  none      Past Medical History:   Diagnosis Date    ADHD (attention deficit hyperactivity disorder)          Tobacco Use      Smoking status: Never        Passive exposure: Never      Smokeless tobacco: Never            OBJECTIVE:  /68   Pulse 92   Temp 98 °F (36.7 °C) (Oral)   Resp 16   Ht 1.803 m (5' 11\")   Wt 90.7 kg (200 lb)   SpO2 95%   BMI 27.89 kg/m²   General appearance: ill-appearing.  ENT exam reveals - bilateral TM red, dull, bulging, pharynx erythematous without exudate, post nasal drip noted, and nasal mucosa congested.   CVS exam: normal rate, regular rhythm, normal S1, S2, no murmurs, rubs, clicks or gallops.  Chest:posterior RLL diminished, lungs otherwise clear.   Abdominal exam: soft, nontender, nondistended, no masses or organomegaly.  Extremities:  No clubbing, cyanosis or edema  Skin exam - normal coloration and turgor, no rashes, no suspicious skin lesions noted.  Psych -  Affect appropriate.  Thought process is normal without evidence of depression or psychosis.    Good insight and appropriae interaction.  Cognition and memory appear to be intact.        ASSESSMENT & PLAN  Juan Luis was seen today for cough.    Diagnoses and all orders for this visit:    Acute cough  -     doxycycline hyclate (VIBRA-TABS) 100 MG tablet; Take 1 tablet by mouth 2 times daily for 7 days  -     predniSONE (DELTASONE) 20 MG tablet; Take

## 2024-11-23 ENCOUNTER — HOSPITAL ENCOUNTER (OUTPATIENT)
Age: 19
Discharge: HOME | End: 2024-11-23
Payer: COMMERCIAL

## 2024-11-23 ENCOUNTER — HOSPITAL ENCOUNTER (OUTPATIENT)
Dept: GENERAL RADIOLOGY | Age: 19
Discharge: HOME | End: 2024-11-23
Payer: COMMERCIAL

## 2024-11-23 DIAGNOSIS — R05.1 ACUTE COUGH: ICD-10-CM

## 2024-11-23 DIAGNOSIS — R06.02 SOB (SHORTNESS OF BREATH): ICD-10-CM

## 2024-11-23 PROCEDURE — 71046 X-RAY EXAM CHEST 2 VIEWS: CPT

## 2024-11-25 ENCOUNTER — TELEPHONE (OUTPATIENT)
Dept: FAMILY MEDICINE CLINIC | Age: 19
End: 2024-11-25

## 2024-11-25 NOTE — TELEPHONE ENCOUNTER
Patient is feeling a little bit better and will call us later to let us know if ready to go to work

## 2024-11-25 NOTE — TELEPHONE ENCOUNTER
----- Message from IMELDA Sears CNP sent at 11/22/2024  1:13 PM EST -----  Please call him and see if his symptoms are improving some?  Does he feel ready to go back to work tomorrow?

## 2024-11-26 NOTE — TELEPHONE ENCOUNTER
Left message on answering machine. Requested pt to call back at [phone], at their earliest convenience.

## 2024-11-26 NOTE — TELEPHONE ENCOUNTER
Pt is not at work today and will need note for today. Pt wants to go back to work tomorrow 11-27-24 Pt is requesting Work slip and will  today in office.

## 2024-12-11 ENCOUNTER — LAB (OUTPATIENT)
Dept: FAMILY MEDICINE CLINIC | Age: 19
End: 2024-12-11

## 2024-12-11 DIAGNOSIS — F41.1 GENERALIZED ANXIETY DISORDER: Primary | ICD-10-CM

## 2024-12-11 NOTE — PROGRESS NOTES
Medication(s) given during visit:    Administrations This Visit       ARIPiprazole ER (ABILIFY MAINTENA) 400 mg       Admin Date  12/11/2024  14:09 Action  Given Dose  400 mg Route  IntraMUSCular Site  Deltoid Right Documented By  Karin Garcia MA    NDC: 61375-360-05    Lot#: [id number]    : [employer]    Patient Supplied?: Yes                    Patient instructed to remain in clinic for 20 minutes after injection and was advised to report any adverse reaction to me immediately.        Pt tolerated well with no complaints.

## 2025-01-13 ENCOUNTER — LAB (OUTPATIENT)
Dept: FAMILY MEDICINE CLINIC | Age: 20
End: 2025-01-13

## 2025-01-13 DIAGNOSIS — F41.1 GENERALIZED ANXIETY DISORDER: Primary | ICD-10-CM

## 2025-01-13 NOTE — PROGRESS NOTES
Medication(s) given during visit:    Administrations This Visit       ARIPiprazole ER (ABILIFY MAINTENA) 400 mg       Admin Date  01/13/2025  14:28 Action  Given Dose  400 mg Route  IntraMUSCular Site  Deltoid Right Documented By  Karin Garcia MA    NDC: 63355-064-78    Lot#: [id number]    : [employer]    Patient Supplied?: Yes                    Patient instructed to remain in clinic for 20 minutes after injection and was advised to report any adverse reaction to me immediately.      Pt tolerated well.

## 2025-02-10 ENCOUNTER — TELEPHONE (OUTPATIENT)
Dept: FAMILY MEDICINE CLINIC | Age: 20
End: 2025-02-10

## 2025-02-10 NOTE — TELEPHONE ENCOUNTER
Pt would like form Exception Member Request Form to be completed to reduce drug charge. It will be scanned into media and placed in in basket.   Fax to [phone] when completed.   Call pt to let him know the form was faxed.

## 2025-02-11 NOTE — TELEPHONE ENCOUNTER
Left detailed message regarding faxing the forms. Okay per HIPAA. Requested call back at [phone]  if they have any further questions.

## 2025-02-14 ENCOUNTER — NURSE ONLY (OUTPATIENT)
Dept: FAMILY MEDICINE CLINIC | Age: 20
End: 2025-02-14

## 2025-02-14 DIAGNOSIS — F90.2 ATTENTION DEFICIT HYPERACTIVITY DISORDER (ADHD), COMBINED TYPE: Primary | ICD-10-CM

## 2025-02-14 NOTE — PROGRESS NOTES
Pt came in for his monthly Abilify shot     Medication(s) given during visit:    Administrations This Visit       ARIPiprazole ER (ABILIFY MAINTENA) 400 mg       Admin Date  02/14/2025  10:51 Action  Given Dose  400 mg Route  IntraMUSCular Site  Deltoid Left Documented By  Ambika Stevens LPN    NDC: 79072-323-64    Lot#: [id number]    : [employer]    Patient Supplied?: No    Comments: Double checked with Karin VALVERDE                    Pt tolerated well.

## 2025-03-17 ENCOUNTER — LAB (OUTPATIENT)
Dept: FAMILY MEDICINE CLINIC | Age: 20
End: 2025-03-17
Payer: COMMERCIAL

## 2025-03-17 DIAGNOSIS — F90.2 ATTENTION DEFICIT HYPERACTIVITY DISORDER (ADHD), COMBINED TYPE: Primary | ICD-10-CM

## 2025-03-17 PROCEDURE — 96372 THER/PROPH/DIAG INJ SC/IM: CPT | Performed by: NURSE PRACTITIONER

## 2025-03-17 NOTE — PROGRESS NOTES
Medication(s) given during visit:    Administrations This Visit       ARIPiprazole ER (ABILIFY MAINTENA) 400 mg       Admin Date  03/17/2025  13:45 Action  Given Dose  400 mg Route  IntraMUSCular Site  Deltoid Right Documented By  Ambika Stevens LPN    NDC: 46308-936-22    Lot#: [id number]    : [email or web address]    Patient Supplied?: Yes    Comments: Double checked with Karin KURTZ and Araseli                    Pt tolerated well

## 2025-04-15 ENCOUNTER — LAB (OUTPATIENT)
Dept: FAMILY MEDICINE CLINIC | Age: 20
End: 2025-04-15
Payer: COMMERCIAL

## 2025-04-15 DIAGNOSIS — F90.2 ATTENTION DEFICIT HYPERACTIVITY DISORDER (ADHD), COMBINED TYPE: Primary | ICD-10-CM

## 2025-04-15 PROCEDURE — 96372 THER/PROPH/DIAG INJ SC/IM: CPT | Performed by: NURSE PRACTITIONER

## 2025-04-15 NOTE — PROGRESS NOTES
Medication(s) given during visit:    Administrations This Visit       ARIPiprazole ER (ABILIFY MAINTENA) 400 mg       Admin Date  04/15/2025  13:35 Action  Given Dose  400 mg Route  IntraMUSCular Site  Deltoid Left Documented By  Karin Garcia MA    NDC: 26784-519-50    Lot#: [id number]    : [employer]    Patient Supplied?: No                    Patient instructed to remain in clinic for 20 minutes after injection and was advised to report any adverse reaction to me immediately.

## 2025-05-09 DIAGNOSIS — R06.02 SOB (SHORTNESS OF BREATH): ICD-10-CM

## 2025-05-09 DIAGNOSIS — R05.1 ACUTE COUGH: ICD-10-CM

## 2025-05-09 RX ORDER — PREDNISONE 20 MG/1
TABLET ORAL
Qty: 10 TABLET | Refills: 0 | OUTPATIENT
Start: 2025-05-09

## 2025-05-14 ENCOUNTER — LAB (OUTPATIENT)
Dept: FAMILY MEDICINE CLINIC | Age: 20
End: 2025-05-14
Payer: COMMERCIAL

## 2025-05-14 DIAGNOSIS — F41.1 GENERALIZED ANXIETY DISORDER: Primary | ICD-10-CM

## 2025-05-14 PROCEDURE — 96372 THER/PROPH/DIAG INJ SC/IM: CPT | Performed by: NURSE PRACTITIONER

## 2025-05-14 NOTE — PROGRESS NOTES
Administrations This Visit       ARIPiprazole ER (ABILISANDRA PAYNEA) 400 mg       Admin Date  05/14/2025  13:09 Action  Given Dose  400 mg Route  IntraMUSCular Site  Deltoid Right Documented By  Vilma Ji LPN    NDC: 99632-845-50    Lot#: [id number]    : [employer]    Patient Supplied?: Yes                       Advised pt to report any adverse reactions.

## 2025-06-06 RX ORDER — ARIPIPRAZOLE 400 MG
KIT INTRAMUSCULAR
Qty: 1 EACH | Refills: 5 | Status: SHIPPED | OUTPATIENT
Start: 2025-06-06

## 2025-06-06 NOTE — TELEPHONE ENCOUNTER
Recent Visits  Date Type Provider Dept   11/22/24 Office Visit Sima Isbell APRN - CNP [facility]   07/10/24 Office Visit Sima Isbell APRN - CNP [facility]   04/19/24 Office Visit Sima Isbell, APRN - CNP [facility]   04/03/24 Office Visit Sima Isbell, APRN - CNP [facility]   Showing recent visits within past 540 days with a meds authorizing provider and meeting all other requirements  Future Appointments  No visits were found meeting these conditions.  Showing future appointments within next 150 days with a meds authorizing provider and meeting all other requirements

## 2025-06-08 ENCOUNTER — HOSPITAL ENCOUNTER (EMERGENCY)
Age: 20
Discharge: HOME | End: 2025-06-08
Attending: EMERGENCY MEDICINE
Payer: COMMERCIAL

## 2025-06-08 VITALS
DIASTOLIC BLOOD PRESSURE: 78 MMHG | WEIGHT: 200 LBS | TEMPERATURE: 98.3 F | BODY MASS INDEX: 28 KG/M2 | HEART RATE: 79 BPM | HEIGHT: 71 IN | RESPIRATION RATE: 18 BRPM | OXYGEN SATURATION: 99 % | SYSTOLIC BLOOD PRESSURE: 119 MMHG

## 2025-06-08 DIAGNOSIS — N48.30 PRIAPISM: Primary | ICD-10-CM

## 2025-06-08 PROCEDURE — 99282 EMERGENCY DEPT VISIT SF MDM: CPT

## 2025-06-08 PROCEDURE — 99215 OFFICE O/P EST HI 40 MIN: CPT

## 2025-06-08 PROCEDURE — 99214 OFFICE O/P EST MOD 30 MIN: CPT

## 2025-06-08 RX ORDER — LIDOCAINE HYDROCHLORIDE 10 MG/ML
5 INJECTION, SOLUTION EPIDURAL; INFILTRATION; INTRACAUDAL; PERINEURAL ONCE
Status: DISCONTINUED | OUTPATIENT
Start: 2025-06-08 | End: 2025-06-08 | Stop reason: HOSPADM

## 2025-06-08 RX ORDER — PHENYLEPHRINE HYDROCHLORIDE 10 MG/ML
200 INJECTION INTRAVENOUS
Status: DISCONTINUED | OUTPATIENT
Start: 2025-06-08 | End: 2025-06-08 | Stop reason: HOSPADM

## 2025-06-08 ASSESSMENT — PAIN DESCRIPTION - LOCATION: LOCATION: PENIS

## 2025-06-08 ASSESSMENT — PAIN SCALES - GENERAL: PAINLEVEL_OUTOF10: 6

## 2025-06-08 ASSESSMENT — PAIN DESCRIPTION - PAIN TYPE: TYPE: ACUTE PAIN

## 2025-06-08 ASSESSMENT — PAIN - FUNCTIONAL ASSESSMENT: PAIN_FUNCTIONAL_ASSESSMENT: 0-10

## 2025-06-08 NOTE — DISCHARGE INSTRUCTIONS
You were seen in the emergency department for prolonged erection which resolved with icing.  You are discharged with instructions to follow-up outpatient with urology as discussed.  You are discouraged from using medications or drugs that could lead to prolonged erections.  Do not hesitate to return to the ED for recurrent painful erections.

## 2025-06-08 NOTE — ED NOTES
Patient educated on transfer to [facility], provided with instructions.  Provider called report to YULISSA Cardona.     Janeen Boykin RN  06/08/25 7360

## 2025-06-08 NOTE — ED NOTES
Patient presents from  for concerns of priaprism that started at 0630. States he has had this before but it went away on its own. Denies street drug use. Denies using any performance inhancing medications. States it has started to go away since UC visit.

## 2025-06-08 NOTE — ED PROVIDER NOTES
[facility]  EMERGENCY DEPARTMENT ENCOUNTER          Pt Name: Juan Luis Paul  MRN: [id number]  Birthdate 2005  Date of evaluation: 6/8/2025  Treating Resident Physician: Larry De La O MD  Supervising Physician: Johnny Sorto MD    History obtained from the patient.     CHIEF COMPLAINT       Chief Complaint   Patient presents with    Priapism       HISTORY OF PRESENT ILLNESS    Jaun Luis Paul is a 20 y.o. male with a PMH of priapism and IV cocaine use who presents to the emergency department complaining of painful erection since 6:30 AM this morning.  Patient denies any recent cocaine use, stating the last use was about 3 months ago.  Patient denies history of sickle cell disease.  He denies use of any other illicit drugs or prescribed medications.  He has attempted icing/warm compressions, Tylenol, ibuprofen for pain management without relief.  On evaluation, patient states noticing decreased tumescence but is still experiencing some pain.  Otherwise, review of systems unremarkable except for aforementioned.      Triage notes and Nursing notes were reviewed by myself.  Any discrepancies are addressed above.    PAST MEDICAL HISTORY     Past Medical History:   Diagnosis Date    ADHD (attention deficit hyperactivity disorder)        SURGICAL HISTORY       Past Surgical History:   Procedure Laterality Date    SPINAL FUSION         CURRENT MEDICATIONS       Previous Medications    ABILIFY MAINTENA 400 MG PRSY    INJECT 1 SYRINGE  INTRAMUSCULARLY  EVERY 28 DAYS    ALBUTEROL SULFATE HFA (PROVENTIL;VENTOLIN;PROAIR) 108 (90 BASE) MCG/ACT INHALER    Inhale 2 puffs into the lungs every 4 hours as needed for Shortness of Breath    BUPROPION (WELLBUTRIN XL) 150 MG EXTENDED RELEASE TABLET        CETIRIZINE (ZYRTEC) 10 MG TABLET    Take 1 tablet by mouth daily    FLUTICASONE (FLONASE) 50 MCG/ACT NASAL SPRAY    2 sprays by Each Nostril route daily    PANTOPRAZOLE (PROTONIX) 40 MG 
   [facility]  EMERGENCY MEDICINE ATTENDING ATTESTATION      Evaluation of Jaun Luis Paul.   Case discussed and care plan developed with resident physician.   I agree with the resident physician documentation and plan as documented by him, except if my documentation differs.   Patient seen, interviewed and examined by me.  I reviewed the medical, surgical, family and social history, medications and allergies.   I have reviewed and interpreted all available lab, radiology and ekg results available at the moment.  I have reviewed the nursing documentation.     Please see the resident physician completed note for final disposition except as documented on this attestation.   I have reviewed and interpreted all available lab, radiology and ekg results available at the moment.  Diagnosis, treatment and disposition plans were discussed and agreed upon by patient.   This transcription was electronically signed. It was dictated by use of voice recognition software and electronically transcribed. The transcription may contain errors not detected in proofreading.     I performed direct supervision and was present for the critical portion following procedures: None  Critical care time on this case: None    Electronically signed by Johnny Sorto MD on 6/8/25 at 12:00 PM EDT       Johnny Sorto MD  06/08/25 3510    
alcohol.    PHYSICAL EXAM     ED TRIAGE VITALS  BP: 112/74, Temp: 98.3 °F (36.8 °C), Pulse: 79, Respirations: 18, SpO2: 98 %  Physical Exam  Vitals and nursing note reviewed.   Constitutional:       General: He is not in acute distress.     Appearance: Normal appearance. He is obese. He is not ill-appearing or diaphoretic.   HENT:      Head: Normocephalic and atraumatic.   Cardiovascular:      Rate and Rhythm: Normal rate.   Pulmonary:      Effort: Pulmonary effort is normal.   Genitourinary:      Skin:     General: Skin is warm and dry.      Capillary Refill: Capillary refill takes less than 2 seconds.   Neurological:      General: No focal deficit present.      Mental Status: He is alert and oriented to person, place, and time. Mental status is at baseline.         DIAGNOSTIC RESULTS   Labs:No results found for this visit on 06/08/25.    IMAGING:  No orders to display      URGENT CARE COURSE:     Vitals:    06/08/25 1042   BP: 112/74   Pulse: 79   Resp: 18   Temp: 98.3 °F (36.8 °C)   SpO2: 98%   Weight: 90.7 kg (200 lb)   Height: 1.803 m (5' 11\")       Medications - No data to display  PROCEDURES:  None  FINAL IMPRESSION      1. Priapism        DISPOSITION/PLAN   DISPOSITION Decision To Transfer 06/08/2025 10:49:55 AM   DISPOSITION CONDITION Stable         Physical exam reveals priapism.  Discussed with patient he will need transfer directly to the emergency room for further evaluation and management of this.  Patient is not on psychiatric medications and denies any medication use to obtain an erection.  Report was called to YULISSA Cardona.  Patient transferred in stable condition.    PATIENT REFERRED TO:  [facility] Emergency Department  [address] [place] [address]  [phone]  Go to   Go directly to [facility] ER    DISCHARGE MEDICATIONS:  New Prescriptions    No medications on file     Current Discharge Medication List          IMELDA Duke CNP, Alecksa N, APRN - CNP  06/08/25

## 2025-06-08 NOTE — ED TRIAGE NOTES
Patient ambulatory to room 3 for complaint of erection x 4 hours that will not subside.  Patient states that he has tried ice and a shower with no relief.  Patient states that this has happened before however it subsided on its own.

## 2025-06-11 ENCOUNTER — CLINICAL SUPPORT (OUTPATIENT)
Dept: FAMILY MEDICINE CLINIC | Age: 20
End: 2025-06-11
Payer: COMMERCIAL

## 2025-06-11 DIAGNOSIS — F90.2 ATTENTION DEFICIT HYPERACTIVITY DISORDER (ADHD), COMBINED TYPE: Primary | ICD-10-CM

## 2025-06-11 PROCEDURE — 96372 THER/PROPH/DIAG INJ SC/IM: CPT | Performed by: NURSE PRACTITIONER

## 2025-06-11 NOTE — PROGRESS NOTES
Pt presented in office to [employer] injection. Pt tolerated well with no further questions.    Medication(s) given during visit:    Administrations This Visit       ARIPiprazole ER (ABILIFY MAINTENA) 400 mg       Admin Date  06/11/2025  13:08 Action  Given Dose  400 mg Route  IntraMUSCular Site  Deltoid Left Documented By  Karin Garcia MA    NDC: 24129-170-19    Lot#: [id number]    : [employer]    Patient Supplied?: Yes                    Patient instructed to remain in clinic for 20 minutes after injection and was advised to report any adverse reaction to me immediately.

## 2025-06-25 ENCOUNTER — OFFICE VISIT (OUTPATIENT)
Dept: UROLOGY | Age: 20
End: 2025-06-25
Payer: COMMERCIAL

## 2025-06-25 VITALS — WEIGHT: 200 LBS | BODY MASS INDEX: 28 KG/M2 | HEART RATE: 76 BPM | OXYGEN SATURATION: 98 % | HEIGHT: 71 IN

## 2025-06-25 DIAGNOSIS — N48.33 PRIAPISM, DRUG-INDUCED: Primary | ICD-10-CM

## 2025-06-25 PROCEDURE — 99203 OFFICE O/P NEW LOW 30 MIN: CPT

## 2025-06-25 NOTE — PROGRESS NOTES
[facility] [place] [facility]  [facility]  [address]  [address]  [facility] [address]  Dept: [phone]  Loc: [phone]  Visit Date: 6/25/2025      HPI  Juan Luis Paul is a 20 y.o. male that presents to the urology clinic as a new patient for the evaluation of priapism.    ED visit on 06/08/25 for priapism. Erection x 4 hours at time of presentation. Detumescence achieved with ice.    Three separate instance within the past 1-2 months.    Marijuana and alcohol use. Patient does admit Cocaine 5 months ago. No recent use per patient account...      Last BUN and Creatinine:  Lab Results   Component Value Date    BUN 15 04/24/2024     Lab Results   Component Value Date    CREATININE 0.9 04/24/2024         PAST MEDICAL, FAMILY AND SOCIAL HISTORY:  Past Medical History:   Diagnosis Date    ADHD (attention deficit hyperactivity disorder)      Past Surgical History:   Procedure Laterality Date    SPINAL FUSION       No family history on file.  Outpatient Medications Marked as Taking for the 6/25/25 encounter (Office Visit) with Easton Mondragon PA-C   Medication Sig Dispense Refill    ABILIFY MAINTENA 400 MG PRSY INJECT 1 SYRINGE  INTRAMUSCULARLY  EVERY 28 DAYS 1 each 5    albuterol sulfate HFA (PROVENTIL;VENTOLIN;PROAIR) 108 (90 Base) MCG/ACT inhaler Inhale 2 puffs into the lungs every 4 hours as needed for Shortness of Breath 18 g 2    pantoprazole (PROTONIX) 40 MG tablet          Sulfa antibiotics  Social History     Tobacco Use   Smoking Status Never    Passive exposure: Never   Smokeless Tobacco Never       Social History     Substance and Sexual Activity   Alcohol Use Never       REVIEW OF SYSTEMS:  Constitutional: negative  Eyes: negative  Respiratory: negative  Cardiovascular: negative  Gastrointestinal: negative  Musculoskeletal: negative  Genitourinary: negative except for what is in HPI  Skin: negative   Neurological: negative  Hematological/Lymphatic:

## 2025-07-09 ENCOUNTER — LAB (OUTPATIENT)
Dept: FAMILY MEDICINE CLINIC | Age: 20
End: 2025-07-09
Payer: COMMERCIAL

## 2025-07-09 DIAGNOSIS — F41.1 GENERALIZED ANXIETY DISORDER: Primary | ICD-10-CM

## 2025-07-09 PROCEDURE — 96372 THER/PROPH/DIAG INJ SC/IM: CPT | Performed by: NURSE PRACTITIONER

## 2025-07-09 NOTE — PROGRESS NOTES
Administrations This Visit       ARIPiprazole ER (ABILIFY MAINTENA) 400 mg       Admin Date  07/09/2025  13:26 Action  Given Dose  400 mg Route  IntraMUSCular Site  Deltoid Right Documented By  Vilma Ji LPN    NDC: 41433-576-18    Lot#: [id number]    : [employer]    Patient Supplied?: Yes                       Advised pt to report any adverse reactions.

## 2025-08-08 ENCOUNTER — APPOINTMENT (OUTPATIENT)
Dept: CT IMAGING | Age: 20
End: 2025-08-08
Payer: COMMERCIAL

## 2025-08-08 ENCOUNTER — HOSPITAL ENCOUNTER (EMERGENCY)
Age: 20
Discharge: HOME | End: 2025-08-08
Attending: EMERGENCY MEDICINE
Payer: COMMERCIAL

## 2025-08-08 VITALS
DIASTOLIC BLOOD PRESSURE: 79 MMHG | WEIGHT: 215 LBS | OXYGEN SATURATION: 96 % | HEIGHT: 71 IN | TEMPERATURE: 98.1 F | SYSTOLIC BLOOD PRESSURE: 110 MMHG | HEART RATE: 108 BPM | BODY MASS INDEX: 30.1 KG/M2 | RESPIRATION RATE: 20 BRPM

## 2025-08-08 DIAGNOSIS — F19.90 IVDU (INTRAVENOUS DRUG USER): ICD-10-CM

## 2025-08-08 DIAGNOSIS — R09.89 DECREASED DORSALIS PEDIS PULSE: ICD-10-CM

## 2025-08-08 DIAGNOSIS — L02.413 ABSCESS OF RIGHT ARM: Primary | ICD-10-CM

## 2025-08-08 LAB
ALBUMIN SERPL BCG-MCNC: 4 G/DL (ref 3.4–4.9)
ALP SERPL-CCNC: 61 U/L (ref 40–129)
ALT SERPL W/O P-5'-P-CCNC: 13 U/L (ref 10–50)
ANION GAP SERPL CALC-SCNC: 11 MEQ/L (ref 8–16)
AST SERPL-CCNC: 16 U/L (ref 10–50)
BASOPHILS ABSOLUTE: 0.1 THOU/MM3 (ref 0–0.1)
BASOPHILS NFR BLD AUTO: 0.7 %
BILIRUB CONJ SERPL-MCNC: < 0.1 MG/DL (ref 0–0.2)
BILIRUB SERPL-MCNC: 0.3 MG/DL (ref 0.3–1.2)
BUN SERPL-MCNC: 11 MG/DL (ref 8–23)
CALCIUM SERPL-MCNC: 9 MG/DL (ref 8.6–10)
CHLORIDE SERPL-SCNC: 104 MEQ/L (ref 98–111)
CO2 SERPL-SCNC: 26 MEQ/L (ref 22–29)
CREAT SERPL-MCNC: 0.9 MG/DL (ref 0.7–1.2)
CRP SERPL-MCNC: 4.75 MG/DL (ref 0–0.5)
DEPRECATED RDW RBC AUTO: 44.7 FL (ref 35–45)
EOSINOPHIL NFR BLD AUTO: 2.1 %
EOSINOPHILS ABSOLUTE: 0.2 THOU/MM3 (ref 0–0.4)
ERYTHROCYTE [DISTWIDTH] IN BLOOD BY AUTOMATED COUNT: 16.5 % (ref 11.5–14.5)
ERYTHROCYTE [SEDIMENTATION RATE] IN BLOOD BY WESTERGREN METHOD: 18 MM/HR (ref 0–20)
GFR SERPL CREATININE-BSD FRML MDRD: > 90 ML/MIN/1.73M2
GLUCOSE SERPL-MCNC: 112 MG/DL (ref 74–109)
HCT VFR BLD AUTO: 34.8 % (ref 42–52)
HGB BLD-MCNC: 10.8 GM/DL (ref 14–18)
IMM GRANULOCYTES # BLD AUTO: 0.01 THOU/MM3 (ref 0–0.07)
IMM GRANULOCYTES NFR BLD AUTO: 0.1 %
LYMPHOCYTES ABSOLUTE: 2.1 THOU/MM3 (ref 1–4.8)
LYMPHOCYTES NFR BLD AUTO: 28.2 %
MCH RBC QN AUTO: 23.2 PG (ref 26–33)
MCHC RBC AUTO-ENTMCNC: 31 GM/DL (ref 32.2–35.5)
MCV RBC AUTO: 74.8 FL (ref 80–94)
MONOCYTES ABSOLUTE: 0.7 THOU/MM3 (ref 0.4–1.3)
MONOCYTES NFR BLD AUTO: 9.2 %
NEUTROPHILS ABSOLUTE: 4.4 THOU/MM3 (ref 1.8–7.7)
NEUTROPHILS NFR BLD AUTO: 59.7 %
NRBC BLD AUTO-RTO: 0 /100 WBC
OSMOLALITY SERPL CALC.SUM OF ELEC: 281.4 MOSMOL/KG (ref 275–300)
PLATELET # BLD AUTO: 336 THOU/MM3 (ref 130–400)
PMV BLD AUTO: 8.8 FL (ref 9.4–12.4)
POTASSIUM SERPL-SCNC: 3.8 MEQ/L (ref 3.5–5.2)
PROCALCITONIN SERPL IA-MCNC: 0.28 NG/ML (ref 0.01–0.09)
PROT SERPL-MCNC: 6.8 G/DL (ref 6.4–8.3)
RBC # BLD AUTO: 4.65 MILL/MM3 (ref 4.7–6.1)
SODIUM SERPL-SCNC: 141 MEQ/L (ref 135–145)
WBC # BLD AUTO: 7.3 THOU/MM3 (ref 4.8–10.8)

## 2025-08-08 PROCEDURE — 87040 BLOOD CULTURE FOR BACTERIA: CPT

## 2025-08-08 PROCEDURE — 90715 TDAP VACCINE 7 YRS/> IM: CPT | Performed by: EMERGENCY MEDICINE

## 2025-08-08 PROCEDURE — 82248 BILIRUBIN DIRECT: CPT

## 2025-08-08 PROCEDURE — 85025 COMPLETE CBC W/AUTO DIFF WBC: CPT

## 2025-08-08 PROCEDURE — 6360000004 HC RX CONTRAST MEDICATION: Performed by: EMERGENCY MEDICINE

## 2025-08-08 PROCEDURE — 6360000002 HC RX W HCPCS: Performed by: EMERGENCY MEDICINE

## 2025-08-08 PROCEDURE — 99285 EMERGENCY DEPT VISIT HI MDM: CPT

## 2025-08-08 PROCEDURE — 80053 COMPREHEN METABOLIC PANEL: CPT

## 2025-08-08 PROCEDURE — 90471 IMMUNIZATION ADMIN: CPT | Performed by: EMERGENCY MEDICINE

## 2025-08-08 PROCEDURE — 85651 RBC SED RATE NONAUTOMATED: CPT

## 2025-08-08 PROCEDURE — 10060 I&D ABSCESS SIMPLE/SINGLE: CPT

## 2025-08-08 PROCEDURE — 36415 COLL VENOUS BLD VENIPUNCTURE: CPT

## 2025-08-08 PROCEDURE — 86140 C-REACTIVE PROTEIN: CPT

## 2025-08-08 PROCEDURE — 73206 CT ANGIO UPR EXTRM W/O&W/DYE: CPT

## 2025-08-08 PROCEDURE — 84145 PROCALCITONIN (PCT): CPT

## 2025-08-08 RX ORDER — DOXYCYCLINE HYCLATE 100 MG
100 TABLET ORAL 2 TIMES DAILY
Qty: 20 TABLET | Refills: 0 | Status: SHIPPED | OUTPATIENT
Start: 2025-08-08 | End: 2025-08-18

## 2025-08-08 RX ORDER — IOPAMIDOL 755 MG/ML
80 INJECTION, SOLUTION INTRAVASCULAR
Status: COMPLETED | OUTPATIENT
Start: 2025-08-08 | End: 2025-08-08

## 2025-08-08 RX ORDER — LIDOCAINE HYDROCHLORIDE AND EPINEPHRINE 10; 10 MG/ML; UG/ML
20 INJECTION, SOLUTION INFILTRATION; PERINEURAL ONCE
Status: DISCONTINUED | OUTPATIENT
Start: 2025-08-08 | End: 2025-08-09 | Stop reason: HOSPADM

## 2025-08-08 RX ADMIN — TETANUS TOXOID, REDUCED DIPHTHERIA TOXOID AND ACELLULAR PERTUSSIS VACCINE, ADSORBED 0.5 ML: 5; 2.5; 8; 8; 2.5 SUSPENSION INTRAMUSCULAR at 21:25

## 2025-08-08 RX ADMIN — IOPAMIDOL 80 ML: 755 INJECTION, SOLUTION INTRAVENOUS at 19:46

## 2025-08-08 ASSESSMENT — PAIN - FUNCTIONAL ASSESSMENT: PAIN_FUNCTIONAL_ASSESSMENT: NONE - DENIES PAIN

## 2025-08-10 LAB
BACTERIA BLD AEROBE CULT: NORMAL
BACTERIA BLD AEROBE CULT: NORMAL

## 2025-08-12 ENCOUNTER — OFFICE VISIT (OUTPATIENT)
Dept: SURGERY | Age: 20
End: 2025-08-12
Payer: COMMERCIAL

## 2025-08-12 ENCOUNTER — LAB (OUTPATIENT)
Dept: FAMILY MEDICINE CLINIC | Age: 20
End: 2025-08-12
Payer: COMMERCIAL

## 2025-08-12 VITALS
HEIGHT: 71 IN | SYSTOLIC BLOOD PRESSURE: 116 MMHG | TEMPERATURE: 97.7 F | OXYGEN SATURATION: 98 % | DIASTOLIC BLOOD PRESSURE: 64 MMHG | HEART RATE: 85 BPM | BODY MASS INDEX: 28.15 KG/M2 | WEIGHT: 201.1 LBS

## 2025-08-12 DIAGNOSIS — F90.2 ATTENTION DEFICIT HYPERACTIVITY DISORDER (ADHD), COMBINED TYPE: Primary | ICD-10-CM

## 2025-08-12 DIAGNOSIS — L02.419 ABSCESS OF FOREARM: Primary | ICD-10-CM

## 2025-08-12 PROCEDURE — 99203 OFFICE O/P NEW LOW 30 MIN: CPT | Performed by: SURGERY

## 2025-08-12 PROCEDURE — 96372 THER/PROPH/DIAG INJ SC/IM: CPT | Performed by: NURSE PRACTITIONER

## 2025-08-13 LAB
BACTERIA BLD AEROBE CULT: NORMAL
BACTERIA BLD AEROBE CULT: NORMAL